# Patient Record
Sex: MALE | Race: OTHER | HISPANIC OR LATINO | URBAN - METROPOLITAN AREA
[De-identification: names, ages, dates, MRNs, and addresses within clinical notes are randomized per-mention and may not be internally consistent; named-entity substitution may affect disease eponyms.]

---

## 2019-06-23 ENCOUNTER — EMERGENCY (EMERGENCY)
Facility: HOSPITAL | Age: 24
LOS: 1 days | Discharge: HOME | End: 2019-06-23
Attending: EMERGENCY MEDICINE
Payer: COMMERCIAL

## 2019-06-23 VITALS
OXYGEN SATURATION: 97 % | RESPIRATION RATE: 16 BRPM | TEMPERATURE: 98 F | DIASTOLIC BLOOD PRESSURE: 75 MMHG | HEIGHT: 71 IN | SYSTOLIC BLOOD PRESSURE: 123 MMHG | HEART RATE: 88 BPM | WEIGHT: 139.99 LBS

## 2019-06-23 PROCEDURE — 99283 EMERGENCY DEPT VISIT LOW MDM: CPT

## 2019-06-23 RX ORDER — TOBRAMYCIN 0.3 %
1 DROPS OPHTHALMIC (EYE) ONCE
Refills: 0 | Status: COMPLETED | OUTPATIENT
Start: 2019-06-23 | End: 2019-06-23

## 2019-06-23 RX ADMIN — Medication 1 DROP(S): at 20:05

## 2019-06-23 NOTE — ED PROVIDER NOTE - CLINICAL SUMMARY MEDICAL DECISION MAKING FREE TEXT BOX
Pt comes in s/p putting unknown liquid in the eye, eye irrigated at , visual acuity intact. Pt to discharge with eye drops and follow up with optho.

## 2019-06-23 NOTE — ED ADULT TRIAGE NOTE - CHIEF COMPLAINT QUOTE
Pt states he "assumes he put ghb or gbl in left eye" via unlabeled visine bottle.  States he comes from \[facility]\"" and wears contacts and states eye was irritated and "based on the people I was hanging out with, the contents in visine bottle, was probably GHB or GBL." C/O irritation and blurred vision.

## 2019-06-23 NOTE — ED PROVIDER NOTE - OBJECTIVE STATEMENT
25yo male with HIV and ADHD comes in due to eye redness. Pt reports he was at a house with friends this morning when he used an eye bottle he thought was eye drops but believes it was actually liquid ectasy. Pt went to urgent care today where they irrigated the eye and sent him to the ED for further evaluation. Pt reports mild blurriness to the eye and redness. Pt denies f/c, n/v.  PMD: Dr. Joseph 25yo male with HIV and ADHD comes in due to left eye redness. Pt reports he was at a house with friends this morning when he used an eye bottle he thought was eye drops but believes it was actually liquid ectasy. Pt went to urgent care today where they irrigated the eye and sent him to the ED for further evaluation. Pt reports mild blurriness to the eye and redness. Pt denies f/c, n/v.  PMD: Dr. Joseph

## 2019-06-23 NOTE — ED PROVIDER NOTE - ATTENDING CONTRIBUTION TO CARE
I personally saw the patient with the PA, and completed the key components of the history and physical exam. I then discussed the management plan with the PA.   gen in nad resp clear cardiac no murmur eyes PERRL EOMI no apd no hypheme nml acuit conjuctival injection OS abx gtt return precautions given

## 2019-06-23 NOTE — ED PROVIDER NOTE - PHYSICAL EXAMINATION
GEN: NAD, A & O x 4  HEAD/EYES: NCAT, PERRL, EOMI, anicteric sclerae, no conjunctival pallor  ENT: mucus membranes moist, oropharynx WNL, trachea midline, no JVD  RESP: lungs CTA with equal breath sounds bilaterally, chest wall nontender and atraumatic  CV: heart with reg rhythm S1, S2, no murmur; distal pulses intact and symmetric bilaterally  GI: normoactive bowel sounds. the abdomen is soft, nondistended, nontender, there are no palpable masses  : no CVAT  MSK: extremities atraumatic and nontender, no edema, no neck or back tenderness  SKIN: warm, dry, no rash, no bruising, no cyanosis. color appropriate for ethnicity  NEURO: alert, mentating appropriately GEN: NAD, A & O x 4  HEAD/EYES: NCAT, PERRL, EOMI, anicteric sclerae, no conjunctival pallor    Left eye: conjunctival erythema, lacrimal drainage, PERRLA, EOMI, 20/20 acuity  ENT: mucus membranes moist, oropharynx WNL, trachea midline  RESP: lungs CTA with equal breath sounds bilaterally, chest wall nontender and atraumatic  CV: heart with reg rhythm S1, S2, no murmur; distal pulses intact and symmetric bilaterally  GI: normoactive bowel sounds. the abdomen is soft, nondistended, nontender, there are no palpable masses  MSK: extremities atraumatic and nontender, no edema, no neck or back tenderness  SKIN: warm, dry, no rash, no bruising, no cyanosis. color appropriate for ethnicity  NEURO: alert, mentating appropriately

## 2020-06-18 NOTE — ED ADULT TRIAGE NOTE - PAIN RATING/NUMBER SCALE (0-10): ACTIVITY
Hpi Title: Evaluation of Skin Lesions
How Severe Are Your Spot(S)?: mild
Family Member: Dad
Additional History: Crusty spot on left ear, itchy spot on upper left arm, and spot on right foot.
4

## 2024-01-01 ENCOUNTER — HOSPITAL ENCOUNTER
Age: 29
End: 2024-01-01
Payer: COMMERCIAL

## 2024-01-01 ENCOUNTER — HOSPITAL ENCOUNTER (INPATIENT)
Dept: HOSPITAL 103 - HO.ED | Age: 29
LOS: 10 days | Discharge: HOME | DRG: 755 | End: 2024-01-11
Payer: COMMERCIAL

## 2024-01-01 VITALS
SYSTOLIC BLOOD PRESSURE: 139 MMHG | TEMPERATURE: 98 F | OXYGEN SATURATION: 97 % | RESPIRATION RATE: 16 BRPM | HEART RATE: 82 BPM | DIASTOLIC BLOOD PRESSURE: 79 MMHG

## 2024-01-01 VITALS
TEMPERATURE: 98.24 F | OXYGEN SATURATION: 98 % | SYSTOLIC BLOOD PRESSURE: 106 MMHG | RESPIRATION RATE: 16 BRPM | DIASTOLIC BLOOD PRESSURE: 98 MMHG | HEART RATE: 104 BPM

## 2024-01-01 VITALS
SYSTOLIC BLOOD PRESSURE: 138 MMHG | HEART RATE: 81 BPM | RESPIRATION RATE: 16 BRPM | TEMPERATURE: 98.2 F | OXYGEN SATURATION: 97 % | DIASTOLIC BLOOD PRESSURE: 94 MMHG

## 2024-01-01 VITALS
SYSTOLIC BLOOD PRESSURE: 125 MMHG | TEMPERATURE: 97.7 F | RESPIRATION RATE: 17 BRPM | HEART RATE: 90 BPM | OXYGEN SATURATION: 100 % | DIASTOLIC BLOOD PRESSURE: 85 MMHG

## 2024-01-01 VITALS
OXYGEN SATURATION: 98 % | HEART RATE: 67 BPM | RESPIRATION RATE: 18 BRPM | DIASTOLIC BLOOD PRESSURE: 78 MMHG | SYSTOLIC BLOOD PRESSURE: 120 MMHG

## 2024-01-01 VITALS
RESPIRATION RATE: 14 BRPM | TEMPERATURE: 97.9 F | HEART RATE: 70 BPM | OXYGEN SATURATION: 97 % | SYSTOLIC BLOOD PRESSURE: 127 MMHG | DIASTOLIC BLOOD PRESSURE: 86 MMHG

## 2024-01-01 VITALS
HEART RATE: 99 BPM | SYSTOLIC BLOOD PRESSURE: 150 MMHG | OXYGEN SATURATION: 98 % | DIASTOLIC BLOOD PRESSURE: 94 MMHG | RESPIRATION RATE: 16 BRPM

## 2024-01-01 VITALS — BODY MASS INDEX: 25.1 KG/M2 | BODY MASS INDEX: 24.8 KG/M2

## 2024-01-01 VITALS — RESPIRATION RATE: 16 BRPM

## 2024-01-01 VITALS — RESPIRATION RATE: 19 BRPM

## 2024-01-01 VITALS — RESPIRATION RATE: 18 BRPM

## 2024-01-01 DIAGNOSIS — F15.20: ICD-10-CM

## 2024-01-01 DIAGNOSIS — F43.11: ICD-10-CM

## 2024-01-01 DIAGNOSIS — Z79.899: ICD-10-CM

## 2024-01-01 DIAGNOSIS — B20: ICD-10-CM

## 2024-01-01 DIAGNOSIS — Z21: ICD-10-CM

## 2024-01-01 DIAGNOSIS — F43.10: Primary | ICD-10-CM

## 2024-01-01 DIAGNOSIS — F15.20: Primary | ICD-10-CM

## 2024-01-01 DIAGNOSIS — Z20.822: ICD-10-CM

## 2024-01-01 LAB
ALANINE AMINOTRANSFERASE: 20 U/L (ref 0–40)
ALBUMIN LEVEL: 5 G/DL (ref 3.5–5)
ALKALINE PHOSPHATASE: 97 U/L (ref 39–117)
ANION GAP: 19 (ref 12–20)
ASPARTATE AMINO TRANSFERASE: 34 U/L (ref 5–37)
BLOOD UREA NITROGEN: 25 MG/DL (ref 9–16)
CALCIUM: 9.8 MG/DL (ref 8.4–10.2)
CARBON DIOXIDE: 22 MMOL/L (ref 22–29)
CHLORIDE: 104 MMOL/L (ref 96–108)
CREAT CL PREDICTED SERPL C-G-VRATE: 90.2 ML/MIN
ESTIMATED GLOMERULAR FILT RATE: > 60
GLUCOSE BLD-MCNC: 91 MG/DL (ref 60–115)
HEMATOCRIT: 39.2 % (ref 42–52)
HEMOGLOBIN: 13.7 G/DL (ref 14–18)
IMM GRANULOCYTES # BLD: 0.01 X10*3/UL (ref 0–0.03)
IMM GRANULOCYTES NFR BLD AUTO: 0.1 % (ref 0–0.4)
LIPASE: 13 U/L (ref 8–78)
LYMPHOCYTES  ABSOLUTE AUTO: 2.8 X10*3/UL (ref 1.2–4.9)
MANUAL DIFF FLAG: NO
MEAN CORPUSCULAR HEMOGLOBIN: 29.5 PG (ref 27–33)
MEAN CORPUSCULAR HGB CONC: 34.9 G/DL (ref 31–36)
MEAN CORPUSCULAR VOLUME: 84.5 FL (ref 80–98)
NRBC ABS AUTO: 0 X10*3/UL (ref 0–0.01)
NRBC PCT AUTO: 0 /100WBC (ref 0–0.2)
PLATELET COUNT: 354 X10*3/UL (ref 160–400)
POTASSIUM: 3.5 MMOL/L (ref 3.3–5.1)
RED BLOOD COUNT: 4.64 X10*6/UL (ref 4.6–5.8)
SODIUM: 141 MMOL/L (ref 135–145)
TOTAL PROTEIN: 8.8 G/DL (ref 6.5–8)
WHITE BLOOD COUNT: 10.1 X10*3/UL (ref 4.8–10.8)

## 2024-01-01 PROCEDURE — 99232 SBSQ HOSP IP/OBS MODERATE 35: CPT

## 2024-01-01 PROCEDURE — 99285 EMERGENCY DEPT VISIT HI MDM: CPT

## 2024-01-01 PROCEDURE — 81001 URINALYSIS AUTO W/SCOPE: CPT

## 2024-01-01 PROCEDURE — 82746 ASSAY OF FOLIC ACID SERUM: CPT

## 2024-01-01 PROCEDURE — 84443 ASSAY THYROID STIM HORMONE: CPT

## 2024-01-01 PROCEDURE — 83690 ASSAY OF LIPASE: CPT

## 2024-01-01 PROCEDURE — 87635 SARS-COV-2 COVID-19 AMP PRB: CPT

## 2024-01-01 PROCEDURE — 82607 VITAMIN B-12: CPT

## 2024-01-01 PROCEDURE — 36415 COLL VENOUS BLD VENIPUNCTURE: CPT

## 2024-01-01 PROCEDURE — 82947 ASSAY GLUCOSE BLOOD QUANT: CPT

## 2024-01-01 PROCEDURE — 87186 SC STD MICRODIL/AGAR DIL: CPT

## 2024-01-01 PROCEDURE — 84439 ASSAY OF FREE THYROXINE: CPT

## 2024-01-01 PROCEDURE — 80053 COMPREHEN METABOLIC PANEL: CPT

## 2024-01-01 PROCEDURE — 87086 URINE CULTURE/COLONY COUNT: CPT

## 2024-01-01 PROCEDURE — 87088 URINE BACTERIA CULTURE: CPT

## 2024-01-01 PROCEDURE — 80307 DRUG TEST PRSMV CHEM ANLYZR: CPT

## 2024-01-01 PROCEDURE — 83036 HEMOGLOBIN GLYCOSYLATED A1C: CPT

## 2024-01-01 PROCEDURE — 83735 ASSAY OF MAGNESIUM: CPT

## 2024-01-01 PROCEDURE — 85025 COMPLETE CBC W/AUTO DIFF WBC: CPT

## 2024-01-01 PROCEDURE — 99231 SBSQ HOSP IP/OBS SF/LOW 25: CPT

## 2024-01-01 PROCEDURE — 82248 BILIRUBIN DIRECT: CPT

## 2024-01-01 PROCEDURE — 93005 ELECTROCARDIOGRAM TRACING: CPT

## 2024-01-01 PROCEDURE — 80061 LIPID PANEL: CPT

## 2024-01-02 VITALS
RESPIRATION RATE: 17 BRPM | DIASTOLIC BLOOD PRESSURE: 73 MMHG | OXYGEN SATURATION: 98 % | SYSTOLIC BLOOD PRESSURE: 109 MMHG | HEART RATE: 80 BPM | TEMPERATURE: 98.42 F

## 2024-01-02 VITALS
DIASTOLIC BLOOD PRESSURE: 84 MMHG | OXYGEN SATURATION: 99 % | HEART RATE: 99 BPM | SYSTOLIC BLOOD PRESSURE: 121 MMHG | RESPIRATION RATE: 18 BRPM | TEMPERATURE: 98.7 F

## 2024-01-02 VITALS
RESPIRATION RATE: 16 BRPM | TEMPERATURE: 97.7 F | OXYGEN SATURATION: 97 % | SYSTOLIC BLOOD PRESSURE: 123 MMHG | DIASTOLIC BLOOD PRESSURE: 78 MMHG | HEART RATE: 69 BPM

## 2024-01-02 LAB — CANNABINOID SCREEN URINE: POSITIVE

## 2024-01-03 ENCOUNTER — HOSPITAL ENCOUNTER
Age: 29
End: 2024-01-03
Payer: COMMERCIAL

## 2024-01-03 VITALS
HEART RATE: 70 BPM | RESPIRATION RATE: 16 BRPM | TEMPERATURE: 97.88 F | DIASTOLIC BLOOD PRESSURE: 75 MMHG | SYSTOLIC BLOOD PRESSURE: 109 MMHG | OXYGEN SATURATION: 98 %

## 2024-01-03 VITALS
RESPIRATION RATE: 16 BRPM | TEMPERATURE: 97.7 F | HEART RATE: 67 BPM | OXYGEN SATURATION: 99 % | SYSTOLIC BLOOD PRESSURE: 117 MMHG | DIASTOLIC BLOOD PRESSURE: 74 MMHG

## 2024-01-03 VITALS — RESPIRATION RATE: 18 BRPM

## 2024-01-03 DIAGNOSIS — R45.851: ICD-10-CM

## 2024-01-03 DIAGNOSIS — R41.82: Primary | ICD-10-CM

## 2024-01-03 PROCEDURE — 93010 ELECTROCARDIOGRAM REPORT: CPT

## 2024-01-04 VITALS
SYSTOLIC BLOOD PRESSURE: 139 MMHG | OXYGEN SATURATION: 100 % | HEART RATE: 76 BPM | TEMPERATURE: 98.6 F | DIASTOLIC BLOOD PRESSURE: 89 MMHG | RESPIRATION RATE: 16 BRPM

## 2024-01-04 VITALS
DIASTOLIC BLOOD PRESSURE: 76 MMHG | RESPIRATION RATE: 16 BRPM | TEMPERATURE: 98.9 F | OXYGEN SATURATION: 97 % | SYSTOLIC BLOOD PRESSURE: 120 MMHG | HEART RATE: 84 BPM

## 2024-01-04 VITALS
DIASTOLIC BLOOD PRESSURE: 81 MMHG | HEART RATE: 63 BPM | OXYGEN SATURATION: 99 % | TEMPERATURE: 97.88 F | SYSTOLIC BLOOD PRESSURE: 126 MMHG | RESPIRATION RATE: 16 BRPM

## 2024-01-04 VITALS
OXYGEN SATURATION: 99 % | RESPIRATION RATE: 18 BRPM | DIASTOLIC BLOOD PRESSURE: 78 MMHG | SYSTOLIC BLOOD PRESSURE: 126 MMHG | HEART RATE: 72 BPM | TEMPERATURE: 98.24 F

## 2024-01-05 VITALS
SYSTOLIC BLOOD PRESSURE: 137 MMHG | RESPIRATION RATE: 16 BRPM | HEART RATE: 110 BPM | DIASTOLIC BLOOD PRESSURE: 85 MMHG | TEMPERATURE: 97.7 F | OXYGEN SATURATION: 99 %

## 2024-01-05 VITALS — DIASTOLIC BLOOD PRESSURE: 88 MMHG | RESPIRATION RATE: 16 BRPM | HEART RATE: 78 BPM | SYSTOLIC BLOOD PRESSURE: 135 MMHG

## 2024-01-05 VITALS — SYSTOLIC BLOOD PRESSURE: 137 MMHG | DIASTOLIC BLOOD PRESSURE: 86 MMHG | HEART RATE: 78 BPM

## 2024-01-05 VITALS
DIASTOLIC BLOOD PRESSURE: 70 MMHG | HEART RATE: 73 BPM | RESPIRATION RATE: 16 BRPM | OXYGEN SATURATION: 99 % | TEMPERATURE: 98.78 F | SYSTOLIC BLOOD PRESSURE: 152 MMHG

## 2024-01-06 VITALS
TEMPERATURE: 97.34 F | RESPIRATION RATE: 16 BRPM | DIASTOLIC BLOOD PRESSURE: 67 MMHG | OXYGEN SATURATION: 97 % | HEART RATE: 94 BPM | SYSTOLIC BLOOD PRESSURE: 130 MMHG

## 2024-01-06 VITALS — DIASTOLIC BLOOD PRESSURE: 79 MMHG | HEART RATE: 75 BPM | SYSTOLIC BLOOD PRESSURE: 131 MMHG | TEMPERATURE: 98.42 F

## 2024-01-06 VITALS — SYSTOLIC BLOOD PRESSURE: 121 MMHG | TEMPERATURE: 98.06 F | HEART RATE: 68 BPM | DIASTOLIC BLOOD PRESSURE: 63 MMHG

## 2024-01-07 VITALS
SYSTOLIC BLOOD PRESSURE: 115 MMHG | TEMPERATURE: 98.1 F | DIASTOLIC BLOOD PRESSURE: 85 MMHG | HEART RATE: 86 BPM | RESPIRATION RATE: 16 BRPM | OXYGEN SATURATION: 98 %

## 2024-01-07 VITALS
HEART RATE: 81 BPM | RESPIRATION RATE: 16 BRPM | SYSTOLIC BLOOD PRESSURE: 124 MMHG | OXYGEN SATURATION: 97 % | TEMPERATURE: 98.6 F | DIASTOLIC BLOOD PRESSURE: 84 MMHG

## 2024-01-08 VITALS
OXYGEN SATURATION: 99 % | SYSTOLIC BLOOD PRESSURE: 122 MMHG | RESPIRATION RATE: 16 BRPM | DIASTOLIC BLOOD PRESSURE: 61 MMHG | TEMPERATURE: 98.6 F | HEART RATE: 89 BPM

## 2024-01-08 VITALS
RESPIRATION RATE: 16 BRPM | SYSTOLIC BLOOD PRESSURE: 124 MMHG | OXYGEN SATURATION: 97 % | HEART RATE: 83 BPM | TEMPERATURE: 98.06 F | DIASTOLIC BLOOD PRESSURE: 81 MMHG

## 2024-01-08 VITALS — SYSTOLIC BLOOD PRESSURE: 140 MMHG | DIASTOLIC BLOOD PRESSURE: 88 MMHG | HEART RATE: 79 BPM

## 2024-01-08 VITALS — SYSTOLIC BLOOD PRESSURE: 143 MMHG | DIASTOLIC BLOOD PRESSURE: 77 MMHG

## 2024-01-09 VITALS
DIASTOLIC BLOOD PRESSURE: 79 MMHG | HEART RATE: 80 BPM | OXYGEN SATURATION: 97 % | SYSTOLIC BLOOD PRESSURE: 139 MMHG | TEMPERATURE: 98.3 F | RESPIRATION RATE: 16 BRPM

## 2024-01-09 VITALS
RESPIRATION RATE: 16 BRPM | HEART RATE: 78 BPM | DIASTOLIC BLOOD PRESSURE: 67 MMHG | OXYGEN SATURATION: 98 % | SYSTOLIC BLOOD PRESSURE: 113 MMHG | TEMPERATURE: 97.3 F

## 2024-01-09 VITALS — SYSTOLIC BLOOD PRESSURE: 134 MMHG | HEART RATE: 77 BPM | DIASTOLIC BLOOD PRESSURE: 65 MMHG

## 2024-01-10 VITALS — SYSTOLIC BLOOD PRESSURE: 137 MMHG | HEART RATE: 88 BPM | RESPIRATION RATE: 16 BRPM | DIASTOLIC BLOOD PRESSURE: 86 MMHG

## 2024-01-10 VITALS
SYSTOLIC BLOOD PRESSURE: 130 MMHG | TEMPERATURE: 97.9 F | OXYGEN SATURATION: 97 % | RESPIRATION RATE: 16 BRPM | HEART RATE: 79 BPM | DIASTOLIC BLOOD PRESSURE: 70 MMHG

## 2024-01-10 VITALS — HEART RATE: 77 BPM | DIASTOLIC BLOOD PRESSURE: 67 MMHG | SYSTOLIC BLOOD PRESSURE: 128 MMHG | TEMPERATURE: 97.4 F

## 2024-01-10 VITALS — SYSTOLIC BLOOD PRESSURE: 130 MMHG | HEART RATE: 83 BPM | DIASTOLIC BLOOD PRESSURE: 75 MMHG

## 2025-01-19 ENCOUNTER — HOSPITAL ENCOUNTER
Age: 30
End: 2025-01-19
Payer: COMMERCIAL

## 2025-01-19 DIAGNOSIS — F33.1: Primary | ICD-10-CM

## 2025-01-19 DIAGNOSIS — F19.950: ICD-10-CM

## 2025-01-19 DIAGNOSIS — F43.10: ICD-10-CM

## 2025-01-19 DIAGNOSIS — R00.0: Primary | ICD-10-CM

## 2025-01-19 DIAGNOSIS — F15.20: ICD-10-CM

## 2025-01-19 DIAGNOSIS — N49.2: Primary | ICD-10-CM

## 2025-01-19 PROCEDURE — 76870 US EXAM SCROTUM: CPT

## 2025-01-19 PROCEDURE — 99231 SBSQ HOSP IP/OBS SF/LOW 25: CPT

## 2025-01-19 PROCEDURE — 99233 SBSQ HOSP IP/OBS HIGH 50: CPT

## 2025-01-19 PROCEDURE — 99232 SBSQ HOSP IP/OBS MODERATE 35: CPT

## 2025-01-19 PROCEDURE — 93010 ELECTROCARDIOGRAM REPORT: CPT

## 2025-01-19 PROCEDURE — 93975 VASCULAR STUDY: CPT

## 2025-05-07 ENCOUNTER — HOSPITAL ENCOUNTER (EMERGENCY)
Age: 30
Discharge: HOME | End: 2025-05-07
Payer: COMMERCIAL

## 2025-05-07 ENCOUNTER — HOSPITAL ENCOUNTER
Age: 30
End: 2025-05-07
Payer: COMMERCIAL

## 2025-05-07 VITALS
SYSTOLIC BLOOD PRESSURE: 171 MMHG | OXYGEN SATURATION: 98 % | HEART RATE: 75 BPM | RESPIRATION RATE: 20 BRPM | DIASTOLIC BLOOD PRESSURE: 110 MMHG

## 2025-05-07 VITALS
HEART RATE: 18 BPM | OXYGEN SATURATION: 98 % | BODY MASS INDEX: 26.6 KG/M2 | DIASTOLIC BLOOD PRESSURE: 103 MMHG | SYSTOLIC BLOOD PRESSURE: 188 MMHG | RESPIRATION RATE: 18 BRPM | TEMPERATURE: 98.4 F

## 2025-05-07 VITALS
HEART RATE: 91 BPM | OXYGEN SATURATION: 99 % | TEMPERATURE: 98.6 F | DIASTOLIC BLOOD PRESSURE: 100 MMHG | RESPIRATION RATE: 18 BRPM | SYSTOLIC BLOOD PRESSURE: 158 MMHG

## 2025-05-07 VITALS
RESPIRATION RATE: 17 BRPM | HEART RATE: 85 BPM | SYSTOLIC BLOOD PRESSURE: 161 MMHG | OXYGEN SATURATION: 98 % | DIASTOLIC BLOOD PRESSURE: 122 MMHG

## 2025-05-07 VITALS
SYSTOLIC BLOOD PRESSURE: 180 MMHG | TEMPERATURE: 96.44 F | DIASTOLIC BLOOD PRESSURE: 128 MMHG | RESPIRATION RATE: 16 BRPM | OXYGEN SATURATION: 96 % | HEART RATE: 109 BPM

## 2025-05-07 VITALS — SYSTOLIC BLOOD PRESSURE: 177 MMHG | DIASTOLIC BLOOD PRESSURE: 123 MMHG | HEART RATE: 113 BPM | OXYGEN SATURATION: 98 %

## 2025-05-07 DIAGNOSIS — Z51.81: ICD-10-CM

## 2025-05-07 DIAGNOSIS — Z03.818: ICD-10-CM

## 2025-05-07 DIAGNOSIS — Z79.899: ICD-10-CM

## 2025-05-07 DIAGNOSIS — R00.0: ICD-10-CM

## 2025-05-07 DIAGNOSIS — F15.288: Primary | ICD-10-CM

## 2025-05-07 DIAGNOSIS — Z71.51: ICD-10-CM

## 2025-05-07 DIAGNOSIS — R07.89: ICD-10-CM

## 2025-05-07 DIAGNOSIS — R00.0: Primary | ICD-10-CM

## 2025-05-07 LAB
ADD LABORATORY TEST: (no result)
ALBUMIN SERPL-MCNC: 5.2 G/DL (ref 3.5–5)
ALKALINE PHOSPHATASE: 110 U/L (ref 39–117)
ALT SERPL-CCNC: 29 U/L (ref 0–40)
ANION GAP SERPL CALC-SCNC: 17 MMOL/L (ref 12–20)
AST SERPL-CCNC: 31 U/L (ref 5–37)
BUN SERPL-MCNC: 17 MG/DL (ref 9–16)
CALCIUM: 10.4 MG/DL (ref 8.4–10.2)
CANNABINOIDS UR QL SCN: POSITIVE
CHLORIDE SERPL-SCNC: 99 MMOL/L (ref 96–108)
CO2 SERPL-SCNC: 25 MMOL/L (ref 22–29)
CREAT CL PREDICTED SERPL C-G-VRATE: 112.5 ML/MIN
HCT VFR BLD AUTO: 42.8 % (ref 42–52)
HGB BLD-MCNC: 15.2 G/DL (ref 14–18)
IMM GRANULOCYTES # BLD: 0.04 X10*3/UL (ref 0–0.03)
IMM GRANULOCYTES NFR BLD AUTO: 0.3 % (ref 0–0.4)
LYMPHOCYTES # SPEC AUTO: 2.6 X10*3/UL (ref 1.2–4.9)
MAGNESIUM: 2.1 MG/DL (ref 1.6–2.6)
MANUAL DIFF FLAG: NO
MCH RBC QN AUTO: 31.1 PG (ref 27–33)
MCHC RBC-ENTMCNC: 35.5 G/DL (ref 31–36)
MCV RBC: 87.7 FL (ref 80–98)
PLATELET # BLD AUTO: 382 X10*3/UL (ref 160–400)
POTASSIUM SERPL-SCNC: 3.5 MMOL/L (ref 3.3–5.1)
PROT SERPL-MCNC: 9.1 G/DL (ref 6.5–8)
RED BLOOD COUNT: 4.88 X10*6/UL (ref 4.6–5.8)
RSV RNA SPEC QL NAA+PROBE: NEGATIVE
SARS-COV-2 RNA RESP QL NAA+PROBE: NEGATIVE
SODIUM SERPL-SCNC: 137 MMOL/L (ref 135–145)
TROPONIN I SERPL HS-MCNC: 8.7 NG/L
WBC # BLD: 12.2 X10*3/UL (ref 4.8–10.8)

## 2025-05-07 PROCEDURE — 85025 COMPLETE CBC W/AUTO DIFF WBC: CPT

## 2025-05-07 PROCEDURE — 99285 EMERGENCY DEPT VISIT HI MDM: CPT

## 2025-05-07 PROCEDURE — 82550 ASSAY OF CK (CPK): CPT

## 2025-05-07 PROCEDURE — 99284 EMERGENCY DEPT VISIT MOD MDM: CPT

## 2025-05-07 PROCEDURE — 0241U: CPT

## 2025-05-07 PROCEDURE — 96360 HYDRATION IV INFUSION INIT: CPT

## 2025-05-07 PROCEDURE — 84484 ASSAY OF TROPONIN QUANT: CPT

## 2025-05-07 PROCEDURE — 80307 DRUG TEST PRSMV CHEM ANLYZR: CPT

## 2025-05-07 PROCEDURE — 80053 COMPREHEN METABOLIC PANEL: CPT

## 2025-05-07 PROCEDURE — 83735 ASSAY OF MAGNESIUM: CPT

## 2025-05-07 PROCEDURE — 93010 ELECTROCARDIOGRAM REPORT: CPT

## 2025-05-07 PROCEDURE — 93005 ELECTROCARDIOGRAM TRACING: CPT

## 2025-05-23 ENCOUNTER — HOSPITAL ENCOUNTER
Age: 30
End: 2025-05-23
Payer: COMMERCIAL

## 2025-05-23 ENCOUNTER — HOSPITAL ENCOUNTER (INPATIENT)
Dept: HOSPITAL 103 - HO.ED | Age: 30
LOS: 3 days | Discharge: HOME | DRG: 722 | End: 2025-05-26
Payer: COMMERCIAL

## 2025-05-23 VITALS — HEART RATE: 117 BPM | SYSTOLIC BLOOD PRESSURE: 125 MMHG | OXYGEN SATURATION: 98 % | DIASTOLIC BLOOD PRESSURE: 79 MMHG

## 2025-05-23 VITALS
RESPIRATION RATE: 24 BRPM | OXYGEN SATURATION: 98 % | HEART RATE: 109 BPM | DIASTOLIC BLOOD PRESSURE: 70 MMHG | TEMPERATURE: 99.8 F | SYSTOLIC BLOOD PRESSURE: 103 MMHG

## 2025-05-23 VITALS
HEART RATE: 113 BPM | SYSTOLIC BLOOD PRESSURE: 116 MMHG | RESPIRATION RATE: 18 BRPM | OXYGEN SATURATION: 97 % | DIASTOLIC BLOOD PRESSURE: 82 MMHG

## 2025-05-23 VITALS
TEMPERATURE: 98.78 F | HEART RATE: 111 BPM | OXYGEN SATURATION: 97 % | SYSTOLIC BLOOD PRESSURE: 132 MMHG | BODY MASS INDEX: 25.5 KG/M2 | RESPIRATION RATE: 16 BRPM | DIASTOLIC BLOOD PRESSURE: 88 MMHG

## 2025-05-23 VITALS
OXYGEN SATURATION: 97 % | HEART RATE: 111 BPM | DIASTOLIC BLOOD PRESSURE: 88 MMHG | SYSTOLIC BLOOD PRESSURE: 132 MMHG | RESPIRATION RATE: 18 BRPM

## 2025-05-23 VITALS
SYSTOLIC BLOOD PRESSURE: 143 MMHG | DIASTOLIC BLOOD PRESSURE: 88 MMHG | TEMPERATURE: 98.96 F | HEART RATE: 110 BPM | OXYGEN SATURATION: 100 % | RESPIRATION RATE: 18 BRPM

## 2025-05-23 VITALS
SYSTOLIC BLOOD PRESSURE: 104 MMHG | DIASTOLIC BLOOD PRESSURE: 65 MMHG | RESPIRATION RATE: 18 BRPM | HEART RATE: 99 BPM | OXYGEN SATURATION: 98 %

## 2025-05-23 VITALS
TEMPERATURE: 103.28 F | OXYGEN SATURATION: 96 % | RESPIRATION RATE: 22 BRPM | HEART RATE: 107 BPM | DIASTOLIC BLOOD PRESSURE: 82 MMHG | SYSTOLIC BLOOD PRESSURE: 119 MMHG

## 2025-05-23 VITALS
HEART RATE: 108 BPM | DIASTOLIC BLOOD PRESSURE: 69 MMHG | OXYGEN SATURATION: 95 % | SYSTOLIC BLOOD PRESSURE: 108 MMHG | RESPIRATION RATE: 20 BRPM

## 2025-05-23 VITALS
DIASTOLIC BLOOD PRESSURE: 75 MMHG | TEMPERATURE: 98.78 F | HEART RATE: 82 BPM | RESPIRATION RATE: 17 BRPM | SYSTOLIC BLOOD PRESSURE: 118 MMHG | OXYGEN SATURATION: 97 %

## 2025-05-23 VITALS — TEMPERATURE: 101.66 F

## 2025-05-23 DIAGNOSIS — R59.0: ICD-10-CM

## 2025-05-23 DIAGNOSIS — Z21: ICD-10-CM

## 2025-05-23 DIAGNOSIS — F15.20: ICD-10-CM

## 2025-05-23 DIAGNOSIS — R50.9: ICD-10-CM

## 2025-05-23 DIAGNOSIS — R50.9: Primary | ICD-10-CM

## 2025-05-23 DIAGNOSIS — B20: Primary | ICD-10-CM

## 2025-05-23 DIAGNOSIS — F19.10: ICD-10-CM

## 2025-05-23 DIAGNOSIS — K63.89: Primary | ICD-10-CM

## 2025-05-23 DIAGNOSIS — A41.9: ICD-10-CM

## 2025-05-23 DIAGNOSIS — F39: ICD-10-CM

## 2025-05-23 DIAGNOSIS — R10.9: ICD-10-CM

## 2025-05-23 DIAGNOSIS — Z79.899: ICD-10-CM

## 2025-05-23 LAB
ANION GAP SERPL CALC-SCNC: 18 MMOL/L (ref 12–20)
APTT PPP: 31.9 SEC (ref 26–36.8)
BAND NEUTROPHILS PERCENT: 19 % (ref 3–5)
BUN SERPL-MCNC: 14 MG/DL (ref 9–16)
CHLORIDE SERPL-SCNC: 98 MMOL/L (ref 96–108)
CO2 SERPL-SCNC: 18 MMOL/L (ref 22–29)
CREAT CL PREDICTED SERPL C-G-VRATE: 99.6 ML/MIN
HCT VFR BLD AUTO: 36.6 % (ref 42–52)
INTERNATIONAL NORM RATIO: 1.3 (ref 0.9–1.1)
LYMPHOCYTES # BLD MANUAL: 0.8 X10*3/UL (ref 1.2–4.9)
LYMPHOCYTES NFR BLD: 9 % (ref 20–40)
MCH RBC QN AUTO: 30.8 PG (ref 27–33)
MCHC RBC-ENTMCNC: 35.5 G/DL (ref 31–36)
MCV RBC: 86.7 FL (ref 80–98)
MONOCYTES # BLD MANUAL: 0.5 X10*3/UL (ref 0.1–1.2)
MONOCYTES NFR BLD MANUAL: 5 % (ref 2–11)
NEUTROPHILS # BLD: 8.1 X10*3/UL (ref 2–8.3)
NEUTROPHILS NFR BLD MANUAL: 67 % (ref 45–73)
PLATELET # BLD AUTO: 270 X10*3/UL (ref 160–400)
POTASSIUM SERPL-SCNC: 3.4 MMOL/L (ref 3.3–5.1)
PROTHROMBIN TIME: 14.9 SEC (ref 10.9–12.4)
RBC MORPH BLD: NORMAL
RED BLOOD COUNT: 4.22 X10*6/UL (ref 4.6–5.8)
SODIUM SERPL-SCNC: 131 MMOL/L (ref 135–145)
TOXIC VACUOLATION: PRESENT
WBC # BLD: 9.4 X10*3/UL (ref 4.8–10.8)

## 2025-05-23 PROCEDURE — 85610 PROTHROMBIN TIME: CPT

## 2025-05-23 PROCEDURE — 86360 T CELL ABSOLUTE COUNT/RATIO: CPT

## 2025-05-23 PROCEDURE — 85027 COMPLETE CBC AUTOMATED: CPT

## 2025-05-23 PROCEDURE — 74177 CT ABD & PELVIS W/CONTRAST: CPT

## 2025-05-23 PROCEDURE — 83605 ASSAY OF LACTIC ACID: CPT

## 2025-05-23 PROCEDURE — 85025 COMPLETE CBC W/AUTO DIFF WBC: CPT

## 2025-05-23 PROCEDURE — 71260 CT THORAX DX C+: CPT

## 2025-05-23 PROCEDURE — 82247 BILIRUBIN TOTAL: CPT

## 2025-05-23 PROCEDURE — 85652 RBC SED RATE AUTOMATED: CPT

## 2025-05-23 PROCEDURE — 86592 SYPHILIS TEST NON-TREP QUAL: CPT

## 2025-05-23 PROCEDURE — 80202 ASSAY OF VANCOMYCIN: CPT

## 2025-05-23 PROCEDURE — 85007 BL SMEAR W/DIFF WBC COUNT: CPT

## 2025-05-23 PROCEDURE — 99221 1ST HOSP IP/OBS SF/LOW 40: CPT

## 2025-05-23 PROCEDURE — 86140 C-REACTIVE PROTEIN: CPT

## 2025-05-23 PROCEDURE — 71045 X-RAY EXAM CHEST 1 VIEW: CPT

## 2025-05-23 PROCEDURE — 99223 1ST HOSP IP/OBS HIGH 75: CPT

## 2025-05-23 PROCEDURE — 99232 SBSQ HOSP IP/OBS MODERATE 35: CPT

## 2025-05-23 PROCEDURE — 80053 COMPREHEN METABOLIC PANEL: CPT

## 2025-05-23 PROCEDURE — 87040 BLOOD CULTURE FOR BACTERIA: CPT

## 2025-05-23 PROCEDURE — 99285 EMERGENCY DEPT VISIT HI MDM: CPT

## 2025-05-23 PROCEDURE — 86780 TREPONEMA PALLIDUM: CPT

## 2025-05-23 PROCEDURE — 85730 THROMBOPLASTIN TIME PARTIAL: CPT

## 2025-05-23 PROCEDURE — 80048 BASIC METABOLIC PNL TOTAL CA: CPT

## 2025-05-23 PROCEDURE — 87086 URINE CULTURE/COLONY COUNT: CPT

## 2025-05-23 PROCEDURE — 87493 C DIFF AMPLIFIED PROBE: CPT

## 2025-05-23 PROCEDURE — 80051 ELECTROLYTE PANEL: CPT

## 2025-05-23 PROCEDURE — 99239 HOSP IP/OBS DSCHRG MGMT >30: CPT

## 2025-05-23 PROCEDURE — 36415 COLL VENOUS BLD VENIPUNCTURE: CPT

## 2025-05-23 PROCEDURE — 86359 T CELLS TOTAL COUNT: CPT

## 2025-05-23 PROCEDURE — 82565 ASSAY OF CREATININE: CPT

## 2025-05-23 RX ADMIN — IOHEXOL 85 ML: 350 INJECTION, SOLUTION INTRAVENOUS at 16:02

## 2025-05-23 RX ADMIN — VANCOMYCIN HYDROCHLORIDE 267.5 MG: 1 INJECTION, POWDER, LYOPHILIZED, FOR SOLUTION INTRAVENOUS at 14:54

## 2025-05-23 RX ADMIN — SODIUM CHLORIDE, PRESERVATIVE FREE 3 ML: 5 INJECTION INTRAVENOUS at 20:21

## 2025-05-24 VITALS
OXYGEN SATURATION: 97 % | HEART RATE: 89 BPM | RESPIRATION RATE: 17 BRPM | TEMPERATURE: 97.88 F | DIASTOLIC BLOOD PRESSURE: 74 MMHG | SYSTOLIC BLOOD PRESSURE: 128 MMHG

## 2025-05-24 VITALS
HEART RATE: 81 BPM | OXYGEN SATURATION: 98 % | RESPIRATION RATE: 18 BRPM | DIASTOLIC BLOOD PRESSURE: 82 MMHG | SYSTOLIC BLOOD PRESSURE: 137 MMHG | TEMPERATURE: 97.3 F

## 2025-05-24 VITALS
DIASTOLIC BLOOD PRESSURE: 75 MMHG | SYSTOLIC BLOOD PRESSURE: 124 MMHG | HEART RATE: 88 BPM | RESPIRATION RATE: 18 BRPM | OXYGEN SATURATION: 97 % | TEMPERATURE: 97.88 F

## 2025-05-24 VITALS
DIASTOLIC BLOOD PRESSURE: 75 MMHG | HEART RATE: 75 BPM | RESPIRATION RATE: 17 BRPM | TEMPERATURE: 97.7 F | OXYGEN SATURATION: 97 % | SYSTOLIC BLOOD PRESSURE: 123 MMHG

## 2025-05-24 LAB
ALBUMIN SERPL-MCNC: 4.1 G/DL (ref 3.5–5)
ALKALINE PHOSPHATASE: 86 U/L (ref 39–117)
ALT SERPL-CCNC: 19 U/L (ref 0–40)
ANION GAP SERPL CALC-SCNC: 14 MMOL/L (ref 12–20)
AST SERPL-CCNC: 26 U/L (ref 5–37)
BUN SERPL-MCNC: 12 MG/DL (ref 9–16)
CALCIUM: 8.6 MG/DL (ref 8.4–10.2)
CHLORIDE SERPL-SCNC: 106 MMOL/L (ref 96–108)
CO2 SERPL-SCNC: 22 MMOL/L (ref 22–29)
CREAT CL PREDICTED SERPL C-G-VRATE: 97.6 ML/MIN
HOLD LAV - POSSIBLE HEMATOLOGY: (no result)
POTASSIUM SERPL-SCNC: 3.2 MMOL/L (ref 3.3–5.1)
PROT SERPL-MCNC: 7.1 G/DL (ref 6.5–8)
SODIUM SERPL-SCNC: 139 MMOL/L (ref 135–145)

## 2025-05-24 RX ADMIN — POTASSIUM CHLORIDE 40 MEQ: 1500 TABLET, EXTENDED RELEASE ORAL at 09:50

## 2025-05-24 RX ADMIN — SODIUM CHLORIDE, PRESERVATIVE FREE 3 ML: 5 INJECTION INTRAVENOUS at 19:43

## 2025-05-24 RX ADMIN — POTASSIUM CHLORIDE 40 MEQ: 1500 TABLET, EXTENDED RELEASE ORAL at 19:43

## 2025-05-24 RX ADMIN — BUPROPION HYDROCHLORIDE 150 MG: 150 TABLET, FILM COATED, EXTENDED RELEASE ORAL at 09:50

## 2025-05-25 VITALS
TEMPERATURE: 97.16 F | DIASTOLIC BLOOD PRESSURE: 84 MMHG | RESPIRATION RATE: 20 BRPM | OXYGEN SATURATION: 99 % | HEART RATE: 75 BPM | SYSTOLIC BLOOD PRESSURE: 135 MMHG

## 2025-05-25 VITALS
DIASTOLIC BLOOD PRESSURE: 96 MMHG | TEMPERATURE: 97.7 F | RESPIRATION RATE: 16 BRPM | HEART RATE: 76 BPM | OXYGEN SATURATION: 98 % | SYSTOLIC BLOOD PRESSURE: 129 MMHG

## 2025-05-25 VITALS
OXYGEN SATURATION: 98 % | SYSTOLIC BLOOD PRESSURE: 120 MMHG | DIASTOLIC BLOOD PRESSURE: 75 MMHG | HEART RATE: 72 BPM | RESPIRATION RATE: 18 BRPM | TEMPERATURE: 98.1 F

## 2025-05-25 VITALS
SYSTOLIC BLOOD PRESSURE: 139 MMHG | HEART RATE: 74 BPM | DIASTOLIC BLOOD PRESSURE: 80 MMHG | TEMPERATURE: 97.88 F | RESPIRATION RATE: 18 BRPM | OXYGEN SATURATION: 98 %

## 2025-05-25 LAB
ADD LABORATORY TEST: (no result)
ANION GAP SERPL CALC-SCNC: 15 MMOL/L (ref 12–20)
C DIFF DNA SPEC QL NAA+PROBE: NEGATIVE
CHLORIDE SERPL-SCNC: 108 MMOL/L (ref 96–108)
CO2 SERPL-SCNC: 21 MMOL/L (ref 22–29)
CREAT CL PREDICTED SERPL C-G-VRATE: 122.6 ML/MIN
HCT VFR BLD AUTO: 37.8 % (ref 42–52)
HGB BLD-MCNC: 13.5 G/DL (ref 14–18)
HOLD LAV - POSSIBLE HEMATOLOGY: (no result)
IMM GRANULOCYTES # BLD: 0.01 X10*3/UL (ref 0–0.03)
IMM GRANULOCYTES NFR BLD AUTO: 0.2 % (ref 0–0.4)
LYMPHOCYTES # SPEC AUTO: 1.6 X10*3/UL (ref 1.2–4.9)
MANUAL DIFF FLAG: NO
MCH RBC QN AUTO: 31.2 PG (ref 27–33)
MCHC RBC-ENTMCNC: 35.7 G/DL (ref 31–36)
MCV RBC: 87.3 FL (ref 80–98)
PLATELET # BLD AUTO: 285 X10*3/UL (ref 160–400)
POTASSIUM SERPL-SCNC: 3.4 MMOL/L (ref 3.3–5.1)
RED BLOOD COUNT: 4.33 X10*6/UL (ref 4.6–5.8)
SODIUM SERPL-SCNC: 141 MMOL/L (ref 135–145)
WBC # BLD: 6.5 X10*3/UL (ref 4.8–10.8)

## 2025-05-25 RX ADMIN — POTASSIUM CHLORIDE 40 MEQ: 1500 TABLET, EXTENDED RELEASE ORAL at 09:27

## 2025-05-25 RX ADMIN — SODIUM CHLORIDE, PRESERVATIVE FREE 3 ML: 5 INJECTION INTRAVENOUS at 08:02

## 2025-05-25 RX ADMIN — SODIUM CHLORIDE, PRESERVATIVE FREE 3 ML: 5 INJECTION INTRAVENOUS at 20:51

## 2025-05-25 RX ADMIN — BUPROPION HYDROCHLORIDE 150 MG: 150 TABLET, FILM COATED, EXTENDED RELEASE ORAL at 09:27

## 2025-05-25 RX ADMIN — POTASSIUM CHLORIDE 40 MEQ: 1500 TABLET, EXTENDED RELEASE ORAL at 20:35

## 2025-05-26 VITALS
HEART RATE: 73 BPM | TEMPERATURE: 97.2 F | SYSTOLIC BLOOD PRESSURE: 142 MMHG | RESPIRATION RATE: 12 BRPM | DIASTOLIC BLOOD PRESSURE: 87 MMHG | OXYGEN SATURATION: 99 %

## 2025-05-26 VITALS
TEMPERATURE: 96.8 F | RESPIRATION RATE: 18 BRPM | OXYGEN SATURATION: 99 % | HEART RATE: 65 BPM | DIASTOLIC BLOOD PRESSURE: 76 MMHG | SYSTOLIC BLOOD PRESSURE: 125 MMHG

## 2025-05-26 LAB — CREAT CL PREDICTED SERPL C-G-VRATE: 129.3 ML/MIN

## 2025-05-26 RX ADMIN — BUPROPION HYDROCHLORIDE 150 MG: 150 TABLET, FILM COATED, EXTENDED RELEASE ORAL at 08:04

## 2025-05-26 RX ADMIN — SODIUM CHLORIDE, PRESERVATIVE FREE 3 ML: 5 INJECTION INTRAVENOUS at 08:06

## 2025-05-26 RX ADMIN — POTASSIUM CHLORIDE 40 MEQ: 1500 TABLET, EXTENDED RELEASE ORAL at 08:04

## 2025-05-29 LAB
CD3 CELLS # SPEC: 1392 CELLS/UL (ref 840–3060)
CD3+CD8+ CELLS NFR BLD: 606 CELLS/UL (ref 180–1170)
PERCENT CD3 CELLS: 86 % (ref 57–85)
PERCENT CD8 CELLS: 37 % (ref 12–42)

## 2025-06-03 LAB — SERVICE CMNT-IMP: (no result)
